# Patient Record
Sex: FEMALE | Race: WHITE | Employment: FULL TIME | URBAN - METROPOLITAN AREA
[De-identification: names, ages, dates, MRNs, and addresses within clinical notes are randomized per-mention and may not be internally consistent; named-entity substitution may affect disease eponyms.]

---

## 2023-01-20 ENCOUNTER — OFFICE VISIT (OUTPATIENT)
Dept: PRIMARY CARE CLINIC | Age: 48
End: 2023-01-20
Payer: COMMERCIAL

## 2023-01-20 VITALS
BODY MASS INDEX: 28.92 KG/M2 | HEART RATE: 89 BPM | HEIGHT: 64 IN | TEMPERATURE: 97.5 F | WEIGHT: 169.4 LBS | RESPIRATION RATE: 18 BRPM | OXYGEN SATURATION: 99 % | SYSTOLIC BLOOD PRESSURE: 116 MMHG | DIASTOLIC BLOOD PRESSURE: 67 MMHG

## 2023-01-20 DIAGNOSIS — F90.0 ATTENTION DEFICIT HYPERACTIVITY DISORDER (ADHD), PREDOMINANTLY INATTENTIVE TYPE: ICD-10-CM

## 2023-01-20 DIAGNOSIS — F41.9 ANXIETY AND DEPRESSION: ICD-10-CM

## 2023-01-20 DIAGNOSIS — K21.9 GASTROESOPHAGEAL REFLUX DISEASE WITHOUT ESOPHAGITIS: ICD-10-CM

## 2023-01-20 DIAGNOSIS — Z00.00 PHYSICAL EXAM: ICD-10-CM

## 2023-01-20 DIAGNOSIS — Z11.59 NEED FOR HEPATITIS C SCREENING TEST: ICD-10-CM

## 2023-01-20 DIAGNOSIS — N95.1 MENOPAUSAL SYMPTOMS: ICD-10-CM

## 2023-01-20 DIAGNOSIS — E66.3 OVERWEIGHT (BMI 25.0-29.9): ICD-10-CM

## 2023-01-20 DIAGNOSIS — Z12.11 COLON CANCER SCREENING: ICD-10-CM

## 2023-01-20 DIAGNOSIS — E03.9 ACQUIRED HYPOTHYROIDISM: Primary | ICD-10-CM

## 2023-01-20 DIAGNOSIS — F32.A ANXIETY AND DEPRESSION: ICD-10-CM

## 2023-01-20 PROCEDURE — 99204 OFFICE O/P NEW MOD 45 MIN: CPT | Performed by: INTERNAL MEDICINE

## 2023-01-20 RX ORDER — LORAZEPAM 1 MG/1
1 TABLET ORAL
Status: CANCELLED | OUTPATIENT
Start: 2023-01-20 | End: 2033-02-17

## 2023-01-20 RX ORDER — LORAZEPAM 1 MG/1
1 TABLET ORAL
COMMUNITY
Start: 2012-12-28 | End: 2023-01-20 | Stop reason: SDUPTHER

## 2023-01-20 RX ORDER — LEVOTHYROXINE AND LIOTHYRONINE 38; 9 UG/1; UG/1
TABLET ORAL
COMMUNITY

## 2023-01-20 RX ORDER — CITALOPRAM 20 MG/1
TABLET, FILM COATED ORAL
COMMUNITY
Start: 2022-04-24

## 2023-01-20 RX ORDER — LORAZEPAM 1 MG/1
1 TABLET ORAL
Qty: 30 TABLET | Refills: 0 | Status: SHIPPED | OUTPATIENT
Start: 2023-01-20 | End: 2023-02-19

## 2023-01-20 RX ORDER — DEXTROAMPHETAMINE SACCHARATE, AMPHETAMINE ASPARTATE, DEXTROAMPHETAMINE SULFATE AND AMPHETAMINE SULFATE 5; 5; 5; 5 MG/1; MG/1; MG/1; MG/1
TABLET ORAL
COMMUNITY
Start: 2022-12-08

## 2023-01-20 RX ORDER — OMEPRAZOLE 40 MG/1
40 CAPSULE, DELAYED RELEASE ORAL DAILY
Qty: 30 CAPSULE | Refills: 0 | Status: SHIPPED | OUTPATIENT
Start: 2023-01-20 | End: 2023-02-19

## 2023-01-20 NOTE — PROGRESS NOTES
1. \"Have you been to the ER, urgent care clinic since your last visit? Hospitalized since your last visit? \" No    2. \"Have you seen or consulted any other health care providers outside of the [address] [place] [address] since your last visit? \" No     3. For patients aged 39-70: Has the patient had a colonoscopy / FIT/ Cologuard? No      If the patient is female:    4. For patients aged 41-77: Has the patient had a mammogram within the past 2 years? Yes - no Care Gap present 2022      5. For patients aged 21-65: Has the patient had a pap smear? Yes - no Care Gap present 2021    Chief Complaint   Patient presents with    [facility]     Med check, premenopausal.   Doesn't sleep well, takes ativan and has been taking it for 10 years. Doesn't work all night.

## 2023-01-20 NOTE — PROGRESS NOTES
Tico España (: 1975) is a 56 y.o. female, new patient, here for evaluation of the following chief complaint(s):  Establish Care (Med check, premenopausal. /Doesn't sleep well, takes ativan and has been taking it for 10 years. Doesn't work all night. )       ASSESSMENT/PLAN:  Below is the assessment and plan developed based on review of pertinent history, physical exam, labs, studies, and medications. 1. Acquired hypothyroidism  -     TSH 3RD GENERATION; Future  She is currently taking [place] thyroid. I ordered blood work to check her TSH. 2. Menopausal symptoms  She is premenopausal and is expecting symptoms. 3. Anxiety and depression  -     REFERRAL TO PSYCHIATRY  -     LORazepam (ATIVAN) 1 mg tablet; Take 1 Tablet by mouth nightly for 30 days. Max Daily Amount: 1 mg., Normal, Disp-30 Tablet, R-0 sent to pharmacy. She is currently taking citalopram 20 mg daily and I recommend that she continue taking it. I referred her to psychiatry. I recommend that she continue seeing her therapist.  I refilled her presription for Ativan until she sees psychiatrist.    4. Attention deficit hyperactivity disorder (ADHD), predominantly inattentive type  -     REFERRAL TO PSYCHIATRY  She has been taking adderall 20 mg.  I recommend that she be formally evaluated. I referred her to psychiatry. 5. Need for hepatitis C screening test  -     HEPATITIS C AB; Future  Ordered Hepatitis C test. Waiting for results. 6. Colon cancer screening  -     OCCULT BLOOD IMMUNOASSAY,DIAGNOSTIC; Future  I ordered a FIT. 7. Physical exam  -     METABOLIC PANEL, COMPREHENSIVE; Future  -     CBC W/O DIFF; Future  -     LIPID PANEL; Future  Complete physical done today. I ordered a CBC, CMP, and a lipid panel. Waiting for results. 8. Overweight (BMI 25.0-29. 9)  We discussed weight gain may be due to hormonal changes and medication. I ordered blood work to check her TSH.   She can discuss decreasing citalopram with Psychiatry. 9. Gastroesophageal reflux disease without esophagitis  -     omeprazole (PRILOSEC) 40 mg capsule; Take 1 Capsule by mouth daily for 30 days. , Normal, Disp-30 Capsule, R-0 sent to pharmacy. I prescribed omeprazole 40 mg to be taken daily. Potential side effects were discussed. SUBJECTIVE/OBJECTIVE:  HPI  Patient presents today to establish care. She is not fasting today. She notes she has not had a PCP for at least 15 years. She states that last year her OBGYN (Dr. Weston Gu) had considered hormone replacement therapy, with her recent labs were unremarkable. She is premenopausal.  She has been taking [place] Thyroid 60 mg for hypothyroidism. She has not noticed any difference since taking it. She is also on citalopram 20 mg. She started seeing a therapist in December. She is taking adderall 20 mg refilled from her Orthopedic doctor. She denies any side effects. She has been taking Ativan 1 mg for 10 year and she does not sleep without it. She had been taking ambien and stopped taking it years ago. She went to the ED years ago for an ulcer or gastritis and was given Pepcid and omeprazole which she took for 2 weeks. The pain returned a few weeks ago but she denies heart burn today. She reports that she has been gaining weight over the past few years. She is concerned as she weighs more than she has previously and her weight has not responded to her changes in diet. She states that she has a benign cyst in her left breast.  She has had an ultrasound which was unremarkable. She is not UTD for pap smear. She denies family history for colon and breast cancer. She has not had any COVID boosters but has had the vaccine. She does not believe that she is UTD for Tdap.     Patient Active Problem List   Diagnosis Code   (none) - all problems resolved or deleted        Current Outpatient Medications on File Prior to Visit   Medication Sig Dispense Refill citalopram (CELEXA) 20 mg tablet citalopram 20 mg tablet      dextroamphetamine-amphetamine (ADDERALL) 20 mg tablet dextroamphetamine-amphetamine 20 mg tablet      thyroid, [place], (ARMOUR) 60 mg tablet [place] Thyroid 60 mg tablet   TAKE 1 TABLET BY MOUTH EVERY DAY AS DIRECTED      Cetirizine 10 mg cap Take  by mouth. [DISCONTINUED] LORazepam (ATIVAN) 1 mg tablet Take 1 mg by mouth.      multivitamin (ONE A DAY) tablet Take 1 Tab by mouth daily. [DISCONTINUED] ibuprofen (MOTRIN) 800 mg tablet Take 1 Tab by mouth every eight (8) hours. [DISCONTINUED] oxyCODONE-acetaminophen (PERCOCET) 5-325 mg per tablet Take 1 Tab by mouth every four (4) hours as needed for Pain. 20 Tab 0    [DISCONTINUED] folic acid 896 mcg tablet Take 400 mcg by mouth daily. [DISCONTINUED] cetirizine (ZYRTEC) 10 mg tablet Take 10 mg by mouth daily. [DISCONTINUED] multivitamin (ONE A DAY) tablet Take 1 Tab by mouth daily. No current facility-administered medications on file prior to visit.        Allergies   Allergen Reactions    Other Medication Rash     Allergic to Birth control medications    Other Medication Rash and Other (comments)     Birth control pills  Swollen vaginal area    Pcn [Penicillins] Rash    Penicillins Rash    Sulfa (Sulfonamide Antibiotics) Rash    Sulfa (Sulfonamide Antibiotics) Rash       Past Medical History:   Diagnosis Date    Delivery normal 3/1/2013       Past Surgical History:   Procedure Laterality Date    HX GYN      HX HEENT      HX OTHER SURGICAL  2002    d&C    HX OTHER SURGICAL  1993    T&A       Family History   Problem Relation Age of Onset    Diabetes Maternal Grandmother     Cancer Paternal Grandmother        Social History     Socioeconomic History    Marital status:      Spouse name: Not on file    Number of children: Not on file    Years of education: Not on file    Highest education level: Not on file   Occupational History    Not on file   Tobacco Use    Smoking status: Never    Smokeless tobacco: Never   Substance and Sexual Activity    Alcohol use: No    Drug use: Not on file    Sexual activity: Yes     Partners: Male     Birth control/protection: None   Other Topics Concern    Not on file   Social History Narrative    ** Merged History Encounter **          Social Determinants of Health     Financial Resource Strain: Not on file   Food Insecurity: Not on file   Transportation Needs: Not on file   Physical Activity: Insufficiently Active    Days of Exercise per Week: 1 day    Minutes of Exercise per Session: 20 min   Stress: Not on file   Social Connections: Not on file   Intimate Partner Violence: Not At Risk    Fear of Current or Ex-Partner: No    Emotionally Abused: No    Physically Abused: No    Sexually Abused: No   Housing Stability: Not on file       No visits with results within 3 Month(s) from this visit.    Latest known visit with results is:   Admission on 02/28/2013, Discharged on 03/03/2013   Component Date Value Ref Range Status    WBC 02/28/2013 19.2 (A)  3.6 - 11.0 K/uL Final    RBC 02/28/2013 4.00  3.80 - 5.20 M/uL Final    HGB 02/28/2013 12.6  11.5 - 16.0 g/dL Final    HCT 02/28/2013 37.0  35.0 - 47.0 % Final    MCV 02/28/2013 92.5  80.0 - 99.0 FL Final    MCH 02/28/2013 31.5  26.0 - 34.0 PG Final    MCHC 02/28/2013 34.1  30.0 - 36.5 g/dL Final    RDW 02/28/2013 12.9  11.5 - 14.5 % Final    PLATELET 02/14/3480 958  150 - 400 K/uL Final    Antibody screen, External 08/07/2012 negative   Final    ABO,Rh 08/07/2012 A positive   Final    verified by ojvan early RN    RPR, External 08/07/2012 non reactive   Final    HBsAg, External 08/07/2012 negative   Final    Gonorrhea, External 08/07/2012 negative   Final    GrBStrep, External 08/07/2012 negative   Final    Chlamydia, External 08/07/2012 negative   Final    HIV, External 08/07/2012 negative   Final    Rubella, External 08/07/2012 NON IMMUNE   Final       Review of Systems   Constitutional:  Negative for activity change, fatigue and unexpected weight change. HENT:  Negative for congestion, hearing loss, rhinorrhea and sore throat. Eyes:  Negative for discharge. Respiratory:  Negative for cough, chest tightness and shortness of breath. Cardiovascular:  Negative for leg swelling. Gastrointestinal:  Negative for abdominal pain, constipation and diarrhea. Heart burn   Genitourinary:  Negative for dysuria, flank pain, frequency and urgency. Musculoskeletal:  Negative for arthralgias, back pain and myalgias. Skin:  Negative for color change and rash. Allergic/Immunologic: Positive for environmental allergies. Neurological:  Negative for dizziness, light-headedness and headaches. Psychiatric/Behavioral:  Positive for dysphoric mood. The patient is nervous/anxious. Visit Vitals  /67 (BP 1 Location: Left upper arm, BP Patient Position: Sitting)   Pulse 89   Temp 97.5 °F (36.4 °C) (Temporal)   Resp 18   Ht 5' 4\" (1.626 m)   Wt 169 lb 6.4 oz (76.8 kg)   LMP 12/20/2022   SpO2 99%   Breastfeeding No   BMI 29.08 kg/m²       Physical Exam  Vitals and nursing note reviewed. Constitutional:       General: She is not in acute distress. Appearance: Normal appearance. She is well-developed. She is not diaphoretic. HENT:      Right Ear: External ear normal.      Left Ear: External ear normal.   Eyes:      General: No scleral icterus. Right eye: No discharge. Left eye: No discharge. Extraocular Movements: Extraocular movements intact. Conjunctiva/sclera: Conjunctivae normal.   Cardiovascular:      Rate and Rhythm: Normal rate and regular rhythm. Pulmonary:      Effort: Pulmonary effort is normal.      Breath sounds: Normal breath sounds. No wheezing. Abdominal:      General: Bowel sounds are normal.      Palpations: Abdomen is soft. Tenderness: There is no abdominal tenderness. Musculoskeletal:      Cervical back: Normal range of motion and neck supple. Lymphadenopathy:      Cervical: No cervical adenopathy. Neurological:      Mental Status: She is alert and oriented to person, place, and time. Psychiatric:         Mood and Affect: Mood and affect normal.         I, Dariel Feliciano MD, personally performed the services described in this documentation as scribed by Renee Stone in my presence, and it is both accurate and complete. An electronic signature was used to authenticate this note.   -- Renee Stone

## 2023-02-15 DIAGNOSIS — F41.9 ANXIETY: Primary | ICD-10-CM

## 2023-02-15 RX ORDER — BUSPIRONE HYDROCHLORIDE 7.5 MG/1
7.5 TABLET ORAL 2 TIMES DAILY
Qty: 60 TABLET | Refills: 0 | Status: SHIPPED | OUTPATIENT
Start: 2023-02-15 | End: 2023-03-17

## 2023-02-23 DIAGNOSIS — K21.9 GASTROESOPHAGEAL REFLUX DISEASE WITHOUT ESOPHAGITIS: ICD-10-CM

## 2023-02-23 RX ORDER — OMEPRAZOLE 40 MG/1
CAPSULE, DELAYED RELEASE ORAL
Qty: 30 CAPSULE | Refills: 0 | Status: SHIPPED | OUTPATIENT
Start: 2023-02-23

## 2023-02-27 DIAGNOSIS — F41.9 ANXIETY: Primary | ICD-10-CM

## 2023-02-27 RX ORDER — LORAZEPAM 0.5 MG/1
0.5 TABLET ORAL
Qty: 10 TABLET | Refills: 0 | Status: SHIPPED | OUTPATIENT
Start: 2023-02-27